# Patient Record
Sex: MALE | Race: WHITE
[De-identification: names, ages, dates, MRNs, and addresses within clinical notes are randomized per-mention and may not be internally consistent; named-entity substitution may affect disease eponyms.]

---

## 2019-01-08 NOTE — HP
*** AMENDED REPORT NOW INCLUDES DESIGNATED COSIGNER ***



CC:  Dr. Derek Rosario *

 

PREOPERATIVE HISTORY AND PHYSICAL:

 

DATE OF ADMISSION:  01/15/19

 

This patient is scheduled for same-day surgery admission by Dr. Joseph, on 
Tuesday, 01/15/19.

 

DATE OF EXAMINATION:  Tuesday, 01/08/19

 

ATTENDING SURGEON:  Dr. Lester Joseph * (dictated by Pema Burch NP).

 

CHIEF COMPLAINT:  Umbilical hernia, right inguinal hernia, possible left 
inguinal hernia.

 

HISTORY OF PRESENT ILLNESS:  The patient is a 68-year-old male with a history 
of hypertension, hypothyroidism, and benign prostatic hypertrophy.  He 
presented to the office for consultation with Dr. Joseph with a right inguinal 
hernia and an umbilical hernia which he has had for up to 1 year.  He notes 
that he was also told that he has a left inguinal hernia by a urologist about 7 
years ago.  Currently, he has discomfort in the right groin and this is most 
notable when he coughs or sneezes or if he has been standing for long periods.  
He denies any umbilical pain or left inguinal pain.  He started using a truss 
for the right inguinal hernia, but discontinued that because it was causing 
back spasms and he was prescribed a muscle relaxant.  He denies any signs or 
symptoms to suggest incarceration or strangulation; he has nocturia x1 typically
, a history of benign prostatic hypertrophy and bladder diverticula for which 
he sees a urologist in [place].  Dr. Joseph examined the patient and there is 
a reducible right inguinal hernia palpated and Dr. Joseph stated that he could 
not feel a left inguinal hernia.  He noted a reducible umbilical hernia with a 
1 to 2 cm sized defect.  Dr. Joseph reviewed the findings with the patient and 
discussed surgical options for repair. He explained the nature of hernias and 
their potential complications.  Dr. Joseph recommended surgical repair and the 
patient has opted for an open repair of the right inguinal hernia with mesh and 
open repair of umbilical hernia with mesh and possible open left inguinal 
hernia repair with mesh.  Dr. Joseph discussed the relevant risks and benefits 
and today I reviewed the typical postoperative care and recovery.  The patient 
has had a chance to ask questions and stated that he understands the 
information and is satisfied with the answers given to his questions.  He will 
sign surgical consent on the day of surgery.

 

PAST MEDICAL HISTORY:  Hypertension, hypothyroidism, and benign prostatic 
hypertrophy.

 

PAST SURGICAL HISTORY:  Vasectomy and excision of skin cancer on his scalp and 
tonsillectomy around age 11.

 

MEDICATIONS:

1.  Levothyroxine 100 mcg p.o. daily.

2.  Amlodipine 5 mg p.o. daily.

3.  Lisinopril 40 mg p.o. daily.

4.  Tamsulosin 0.4 mg p.o. b.i.d.

5.  Metaxalone 800 mg t.i.d. p.r.n. back spasms.

 

He states that he takes most of his medications in the evening.

 

ALLERGIES:  No known drug allergies.  He is allergic to BEE STINGS.

 

FAMILY HISTORY:  Father with a history of pancreatic cancer, sister had breast 
cancer.  No known anesthesia complications, bleeding disorders, or clotting 
disorders.

 

SOCIAL HISTORY:  He is  and retired.  He is a nonsmoker.  He denies use 
of alcohol or other substances.

 

REVIEW OF SYSTEMS:  Constitutional:  No fevers, chills, excessive fatigue, or 
weight loss.  Endocrine:  He is treated for hypothyroidism.  No diabetes. 
Hematologic:  No easy bruising or bleeding.  He has never required a blood 
transfusion.  Respiratory:  No dyspnea on exertion, no chronic cough. 
Cardiovascular:  No history of myocardial infarction, no anginal chest pain, no 
palpitations or chest pressure.  Gastrointestinal:  No nausea, vomiting, 
diarrhea, GI bleeding, or constipation.  No change in bowel habits.  
Genitourinary:  Nocturia x1 typically; benign prostatic hypertrophy.  No pain 
with urination. Musculoskeletal:  Intermittent back spasms, currently taking a 
muscle relaxant as needed, no other joint pain.  Integumentary:  No chronic 
rashes or skin changes. Neurologic:  No headache, no blurred vision, no areas 
of focal weakness or numbness.  General:  No previous anesthesia complications, 
no history of deep vein thrombosis or pulmonary embolism.

 

                               PHYSICAL EXAMINATION

 

GENERAL SURVEY:  The patient is a 68-year-old overweight male, well developed, 
in no acute distress.

 

VITAL SIGNS:  Height 68.5 inches, weight 225 pounds, body mass index 33.7.  
Blood pressure 130/84, pulse 72 and regular, respiratory rate 16, temperature 
97.3 tympanic.

 

SKIN:  Warm, dry, intact.

 

HEENT:  Benign.

 

NECK:  Supple.  No cervical lymphadenopathy, no thyromegaly.  Trachea midline.

 

BACK:  No CVA tenderness.

 

LUNGS:  Breath sounds bilaterally clear and equal.

 

HEART:  Regular rate and rhythm.  No murmurs or rubs appreciated.

 

ABDOMEN:  Obese, active bowel sounds, soft, nondistended, nontender throughout. 
Obvious umbilical hernia, reducible, with a 1 to 2 cm sized defect.  No other 
obvious abdominal masses, no scars, no organomegaly.  Inguinal exam done by Dr. Joseph reveals a reducible right inguinal hernia and no obvious left inguinal 
hernia was palpated.

 

EXTREMITIES:  Warm, without edema or skin ulceration.

 

GENITALIA AND RECTAL:  Exams deferred.

 

NEUROLOGIC:  Alert and oriented x3, steady gait.

 

 IMPRESSION:

1.  Right inguinal hernia.

2.  Possible left inguinal hernia.

3.  Umbilical hernia.

 

PLAN/RECOMMENDATIONS:  Same-day surgery admission to Dr. Joseph' service on 
Tuesday, 01/15/19, for open right inguinal hernia repair with mesh, possible 
open left inguinal hernia repair with mesh, open umbilical hernia repair with 
mesh.

 

 ____________________________________ ADELAIDA BURCH, NP

 

[id number]/CPS #: [id number]

MTDD

## 2019-01-15 ENCOUNTER — HOSPITAL ENCOUNTER (OUTPATIENT)
Dept: HOSPITAL 25 - OR | Age: 69
Discharge: HOME | End: 2019-01-15
Attending: SURGERY
Payer: COMMERCIAL

## 2019-01-15 VITALS — SYSTOLIC BLOOD PRESSURE: 125 MMHG | DIASTOLIC BLOOD PRESSURE: 87 MMHG

## 2019-01-15 DIAGNOSIS — K40.90: Primary | ICD-10-CM

## 2019-01-15 DIAGNOSIS — I10: ICD-10-CM

## 2019-01-15 DIAGNOSIS — N40.0: ICD-10-CM

## 2019-01-15 DIAGNOSIS — K42.9: ICD-10-CM

## 2019-01-15 DIAGNOSIS — E03.9: ICD-10-CM

## 2019-01-15 DIAGNOSIS — Z85.828: ICD-10-CM

## 2019-01-15 PROCEDURE — C1781 MESH (IMPLANTABLE): HCPCS

## 2019-01-16 NOTE — OP
CC:  Derek Rosario MD *

 

DATE OF OPERATION:  01/15/19 - [facility]

 

DATE OF BIRTH:  [birth year]

 

SURGEON:  Lester Joseph MD

 

ASSISTANT:  NATAN Bermeo

 

ANESTHESIOLOGIST:  Dr. George.

 

ANESTHESIA:  Local MAC.

 

PRE-OP DIAGNOSES:  Right inguinal hernia and umbilical hernia.

 

POST-OP DIAGNOSES:  Right inguinal hernia and umbilical hernia.

 

OPERATIVE PROCEDURE:  Open repair of right inguinal hernia with mesh and open 
primary repair of umbilical hernia.

 

ESTIMATED BLOOD LOSS:  Minimal.

 

FLUIDS:  Crystalloids.

 

SPECIMENS:  None.

 

DRAINS:  None.

 

COMPLICATIONS:  None.

 

COUNTS:  The instrument, needle, and sponge counts were correct.

 

OPERATIVE FINDINGS:  Direct and indirect right inguinal hernia.  An 1.5 cm 
umbilical hernia.

 

DESCRIPTION OF PROCEDURE:  The patient was brought to the operating room and 
placed on the table supine.  Sequential compression devices were placed on both 
lower extremities.  Intravenous sedation was administered as well as 
intravenous antibiotics.  The patient was positioned and padded appropriately 
and he was prepped and draped in the usual sterile fashion and a time-out was 
performed.

 

Local anesthetic was infiltrated as a field block in the right groin.  An 
oblique incision was created carrying this down into the subcutaneous tissue 
where crossing veins were divided between clamps and ligated with 4 absorbable 
ties.  Additional subcutaneous tissue was divided with cautery and external 
oblique aponeurosis was identified, then infiltrated with local anesthetic and 
opened along the line of its fibers through the superficial ring.  The patient 
had a large indirect inguinal hernia which contained small bowel and there was 
also a direct inguinal hernia which appeared to contain only fat.  The decision 
was to perform this repair with mesh.

 

First, the ilioinguinal nerve was identified, mobilized, and preserved in the 
lateral position.  The contents of the inguinal canal were then isolated with a 
1/4- inch Penrose drain and the indirect inguinal hernia sac was identified.  
The cord structures were dissected free from this and this was reduced beyond 
the deep ring. In order to keep this reduced, a sponge stick was used to hold 
this in while the dissection proceeded further identifying the direct inguinal 
hernia.  At this point, a Ethicon PHS extended size patch was selected and the 
preperitoneal space was opened along the lateral border of the rectus.  The 
preperitoneal space was dissected out using blunt dissection with a finger and 
sponge stick.  The inferior epigastric vessels were preserved.  The PHS patch 
was placed with a preperitoneal patch unfurled into the space and then the 
onlay portion was exteriorized and sutured to the pubic tubercle, the conjoint 
tendon and the shelving edge of the inguinal ligament with interrupted 2-0 
Vicryl suture.  An inferior slit was created in the mesh to allow the egress of 
the spermatic cord.  This slit was reconstituted with interrupted suture of 2-0 
Prolene.  Additional sutures of 2-0 Vicryl were placed along the inguinal 
ligament and at this point, because of the large size of the direct ring, it 
was decided to use a mesh plug in this position.  So, a plug was placed into 
the deep ring and sutured to the overlying musculature with 2-0 Vicryl. The 
lateral portion of the onlay part of the PHS was then tucked beneath the 
external oblique aponeurosis and sutured to the underlying muscle with 2-0 
Vicryl. Due to the patient's obesity and the size of the hernia and the coarse 
of the ilioinguinal nerve, it was decided that the nerve would have to be 
sacrificed; and therefore, this was clamped and divided, ligated with 4-0 
absorbable tie.  The external oblique aponeurosis was then run closed with 2-0 
Vicryl and the wound was then tacked with saline moistened gauze as the 
umbilical hernia was addressed.

 

Local anesthetic was again infiltrated as a field block.  An infraumbilical 
incision was created and the umbilicus was  from the anterior 
abdominal wall using sharp dissection. Fat contained within the hernia was 
reduced and the edges of the hernia defect were defined as 1.5 cm defect.  It 
was determined that this could be closed primarily and 0 Ethibond suture was 
used taking 2 U stitches to complete this closure.  The umbilical stalk was 
then reapproximated to the fascia with 3-0 Vicryl and the skin at this site was 
closed with 3-0 Vicryl for the deep dermis, 4-0 Monocryl for the skin in a 
running subcuticular fashion.  In the groin, the Saleem's fascia was closed 
with 3-0 Vicryl in interrupted fashion.  The skin was closed with 4-0 Monocryl 
in subcuticular fashion.  Steri-Strips and dressings were applied at both 
sites.  The patient tolerated the procedure well, was awakened and transferred 
to recovery room in stable condition.

 

 [id number]/CPS #: [id number]

MTDD

## 2019-10-29 ENCOUNTER — HOSPITAL ENCOUNTER (EMERGENCY)
Dept: HOSPITAL 25 - ED | Age: 69
Discharge: HOME | End: 2019-10-29
Payer: MEDICARE

## 2019-10-29 VITALS — SYSTOLIC BLOOD PRESSURE: 141 MMHG | DIASTOLIC BLOOD PRESSURE: 93 MMHG
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
## 2019-10-29 DIAGNOSIS — S73.102A: Primary | ICD-10-CM
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
## 2019-10-29 DIAGNOSIS — E07.9: ICD-10-CM

## 2019-10-29 DIAGNOSIS — Z98.52: ICD-10-CM

## 2019-10-29 DIAGNOSIS — Z85.828: ICD-10-CM

## 2019-10-29 DIAGNOSIS — I10: ICD-10-CM

## 2019-10-29 DIAGNOSIS — Z79.899: ICD-10-CM

## 2019-10-29 PROCEDURE — 99282 EMERGENCY DEPT VISIT SF MDM: CPT

## 2019-10-29 NOTE — XMS REPORT
Continuity of Care Document (CCD)

 Created on:2019



Patient:Rivas Maddox

Sex:Male

:1950

External Reference #:[id number]





Demographics







 Address  [address] [place] [address]

 

 Mobile Phone  [phone]

 

 Email Address  [email or web address]

 

 Preferred Language  en

 

 Marital Status  Not  or 

 

 Mu-ism Affiliation  Unknown

 

 Race  White

 

 Ethnic Group  Not  or 









Author







 Name  Lester Joseph MD, FACS (transmitted by agent of provider Adali Orozco)

 

 Address  [address]



   Unavailable



   [place] [address]









Care Team Providers







 Name  Role  Phone

 

 Derek Rosario MD - Family Medicine  Care Team Information   [phone]
[phone]









Problems







 Description

 

 No Information Available







Social History







 Type  Date  Description  Comments

 

 Birth Sex    Unknown  

 

 Smokeless Tobacco    Never Used Smokeless  



     Tobacco  

 

 ETOH Use    Occasionally consumes  



     alcohol  

 

 Tobacco Use  Start: Unknown  Patient has never  



     smoked  

 

 Recreational Drug Use    Current Drug User  occasional marijuana

 

 Smoking Status  Reviewed: 10/28/19  Patient has never  



     smoked  

 

 Exercise Type/Frequency    Exercises regularly  







Allergies, Adverse Reactions, Alerts







 Active Allergies  Reaction  Severity  Comments  Date

 

 Bee Sting        2018







Medications







 Active Medications  SIG  Qnty  Indications  Ordering Provider  Date

 

 Levothyroxine Sodium  1 by mouth      Unknown  



                 100mcg  every day        



 Tablets          

 

 Amlodipine Besylate  1 by mouth      Unknown  



                5mg  every day        



 Tablets          

 

 Lisinopril  1 by mouth      Unknown  



       40mg Tablets  every day        



           

 

 Tamsulosin HCL  1 by mouth      Unknown  



           0.4mg Capsules  twice a day        



           







Immunizations







 Description

 

 No Information Available







Vital Signs







 Date  Vital  Result  Comment

 

 10/28/2019  8:49am  Height  68.5 inches  5'8.50"









 Weight  220.00 lb  

 

 Heart Rate  72 /min  

 

 BP Systolic Sitting  124 mmHg  

 

 BP Diastolic Sitting  82 mmHg  

 

 Respiratory Rate  18 /min  

 

 Body Temperature  97.7 F  

 

 BMI (Body Mass Index)  33.0 kg/m2  









 2019  2:00pm  Heart Rate  58 /min  









 Respiratory Rate  18 /min  

 

 Body Temperature  97.2 F  







Results







 Description

 

 No Information Available







Procedures







 Description

 

 No Information Available







Medical Devices







 Description

 

 No Information Available







Encounters







 Description

 

 No Information Available







Assessments







 Date  Code  Description  Provider

 

 10/28/2019  R10.31  Right lower quadrant pain  Lester Joseph MD, FACS







Plan of Treatment

Future Appointment(s):2019  1:15 pm - Lester Joseph MD, FACS at [facility] - Lester Joseph MD, FACSR10.31 Right lower quadrant 
painFollow up:after testing is completed



Functional Status







 Description

 

 No Information Available







Mental Status







 Description

 

 No Information Available







Referrals







 Description

 

 No Information Available

## 2019-12-24 ENCOUNTER — HOSPITAL ENCOUNTER (EMERGENCY)
Dept: HOSPITAL 25 - ED | Age: 69
Discharge: HOME | End: 2019-12-24
Payer: MEDICARE

## 2019-12-24 VITALS — DIASTOLIC BLOOD PRESSURE: 92 MMHG | SYSTOLIC BLOOD PRESSURE: 116 MMHG
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
## 2019-12-24 DIAGNOSIS — I10: ICD-10-CM

## 2019-12-24 DIAGNOSIS — Z91.030: ICD-10-CM

## 2019-12-24 DIAGNOSIS — S49.92XA: Primary | ICD-10-CM

## 2019-12-24 PROCEDURE — 99282 EMERGENCY DEPT VISIT SF MDM: CPT

## 2019-12-24 NOTE — XMS REPORT
Continuity of Care Document (CCD)

 Created on:2019



Patient:Rivas Maddox

Sex:Male

:1950

External Reference #:[id number]





Demographics







 Address  [address] [place] [address]

 

 Mobile Phone  [phone]

 

 Email Address  [email or web address]

 

 Preferred Language  en

 

 Marital Status  Not  or 

 

 Adventism Affiliation  Unknown

 

 Race  White

 

 Ethnic Group  Not  or 









Author







 Name  Ashok Yan MD (transmitted by agent of provider Kilo Phillips)

 

 Address  [address] [place] [id number]









Care Team Providers







 Name  Role  Phone

 

 Derek Rosario MD - Family Medicine  Care Team Information   [phone]
[phone]









Problems







 Description

 

 No Information Available







Social History







 Type  Date  Description  Comments

 

 Birth Sex    Unknown  

 

 Smokeless Tobacco    Never Used Smokeless  



     Tobacco  

 

 ETOH Use    Occasionally consumes  



     alcohol  

 

 Tobacco Use  Start: Unknown  Patient has never  



     smoked  

 

 Recreational Drug Use    Current Drug User  occasional marijuana

 

 Smoking Status  Reviewed: 19  Patient has never  



     smoked  

 

 Exercise Type/Frequency    Exercises regularly  







Allergies, Adverse Reactions, Alerts







 Active Allergies  Reaction  Severity  Comments  Date

 

 Bee Sting        2018







Medications







 Active Medications  SIG  Qnty  Indications  Ordering Provider  Date

 

 Levothyroxine Sodium  1 by mouth      Unknown  



                 100mcg  every day        



 Tablets          

 

 Amlodipine Besylate  1 by mouth      Unknown  



                5mg  every day        



 Tablets          

 

 Lisinopril  1 by mouth      Unknown  



       40mg Tablets  every day        



           

 

 Tamsulosin HCL  2tabs by mouth      Unknown  



           0.4mg Capsules  once a day        



           









 History Medications









 Predisone  pt states he is taking a 9 day      Thien Casillas M.D.  2019 - 
2019



   supply then taper off to        



   discontinue        







Immunizations







 Description

 

 No Information Available







Vital Signs







 Date  Vital  Result  Comment

 

 2019  9:42am  Height  68.5 inches  5'8.50"









 Weight  225.00 lb  stated

 

 Heart Rate  74 /min  

 

 BP Systolic  128 mmHg  

 

 BP Diastolic  76 mmHg  

 

 Respiratory Rate  12 /min  

 

 Pain Level  4  

 

 BMI (Body Mass Index)  33.7 kg/m2  









 2019  1:45pm  Height  68.5 inches  5'8.50"









 Weight  225.25 lb  

 

 Heart Rate  86 /min  

 

 BP Systolic  140 mmHg  

 

 BP Diastolic  98 mmHg  

 

 Respiratory Rate  18 /min  

 

 Body Temperature  98.2 F  

 

 Pain Level  0  

 

 BMI (Body Mass Index)  33.7 kg/m2  







Results







 Description

 

 No Information Available







Procedures







 Date  Code  Description  Status

 

 2019  Inj/Aspir Major JT Or Bursa W/ US  Completed







Medical Devices







 Description

 

 No Information Available







Encounters







 Type  Date  Location  Provider  Dx  Diagnosis

 

 Office Visit  10/28/2019  [facility]  Lester Joseph,  R10.31  Right 
lower



   8:45a  Of Riz[place]ana HAIRSTON, FACS    quadrant pain







Assessments







 Date  Code  Description  Provider

 

 2019  M54.5  Low back pain  Ashok Yan MD

 

 2019  M16.12  Unilateral primary osteoarthritis, left  Ashok Yan MD



     hip  

 

 2019  M54.16  Radiculopathy, lumbar region  Ashok Yan MD

 

 2019  M54.5  Low back pain  Thien Casillas M.D.

 

 2019  S76.112D  Strain of left quadriceps muscle, fascia  Thien Casillas M.D.



     and tendon, subsequent encounter  

 

 10/28/2019  R10.31  Right lower quadrant pain  Lester Joseph MD, FACS







Plan of Treatment

Future Appointment(s):2019  1:15 pm - Ashok Yan MD at [facility] [id number] - Ashok Yan MDM54.5 Low back painNew 
Therapy:Physical TherapyFollow up:Follow up:   2 gykyyF28.12 Unilateral primary 
osteoarthritis, left hipNew Therapy:Physical EyrmgbwL21.16 Radiculopathy, 
lumbar regionNew Therapy:Physical Therapy



Functional Status







 Description

 

 No Information Available







Mental Status







 Description

 

 No Information Available







Referrals







 Description

 

 No Information Available

## 2019-12-24 NOTE — ED
Upper Extremity Pain





- HPI Summary


HPI Summary: 





This pt is a 70 y/o male presenting [facility] c/o worsening left shoulder pain s/

p recent fall today and injury 3 weeks ago. Pt reports 3 weeks ago he was 

walking to his car when he slipped and fell on his buttocks striking his left 

elbow. He notes he saw Dr. Yan, orthopedist, and was told he needed an MRI 

to rule our torn rotator cuff (which has already been scheduled). At that time 

pt had abduction of left arm up to 90 degrees and couldn't abduct arm any 

higher. Today he was in a rush when he slipped and fell landing on his left 

shoulder/left anterior chest. Today he reports he is unable to raise his left 

arm secondary to pain. He denies any other injuries from today's fall. Pt 

states he is able to ambulate without any difficulties.


No PMHx.


Denies tobacco, alcohol and drug use. 


NKDA. 








Medications reviewed. Allergies noted. 





- History of Current Complaint


Chief Complaint: RAYMUNDOhouBenito


Stated Complaint: FALL- SHOULDER AND BACK PAIN


Time Seen by Provider: 12/24/19 12:52


Hx Obtained From: Patient


Mechanism Of Injury: Fall From A Standing Position


Onset/Duration: Started Hours Ago, Still Present


Timing: Lasting Hours


Severity Currently: Mild


Pain Location: Shoulder - left


Aggravating Factor(s): Movement, Abduction


Alleviating Factor(s): Rest


Associated Signs & Symptoms: Positive: Negative





- Allergies/Home Medications


Allergies/Adverse Reactions: 


 Allergies











Allergy/AdvReac Type Severity Reaction Status Date / Time


 


bee venom protein (honey bee) Allergy  Swelling Verified 12/24/19 12:26














PMH/Surg Hx/FS Hx/Imm Hx


Endocrine/Hematology History: Reports: Hx Thyroid Disease


   Denies: Hx Diabetes


Cardiovascular History: Reports: Hx Hypertension - on meds


   Denies: Hx Pacemaker/ICD, Other Cardiovascular Problems/Disorders


Respiratory History: 


   Denies: Other Respiratory Problems/Disorders


GI History: 


   Denies: Other GI Disorders


 History: 


   Denies: Hx Renal Disease


Sensory History: 


   Denies: Hx Contacts or Glasses, Hx Hearing Aid


Opthamlomology History: 


   Denies: Hx Contacts or Glasses


Neurological History: Reports: Hx Migraine - past


   Denies: Other Neuro Impairments/Disorders


Psychiatric History: Reports: Hx Depression - hx of, no meds


   Denies: Hx Panic Disorder





- Cancer History


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





- Surgical History


Surgical History: Yes


Surgery Procedure, Year, and Place: squamous cell from head.  vasectomy 40 yrs 

ago.  HERNIA REPAIR 1/2019 [facility]


Hx Anesthesia Reactions: No


Infectious Disease History: No


Infectious Disease History: 


   Denies: Traveled Outside the [place] in Last 30 Days





- Family History


Known Family History: Positive: Non-Contributory





- Social History


Alcohol Use: Occasionally


Alcohol Amount: 1 per month


Substance Use Type: Reports: Marijuana


Substance Use Comment - Amount & Last Used: daily


Hx Tobacco Use: No


Smoking Status (MU): Never Smoked Tobacco





Review of Systems


Negative: Fever


Cardiovascular: Negative


Respiratory: Negative


Gastrointestinal: Negative


Musculoskeletal: Other - POSITIVE: left shoulder pain


Neurological: Negative


All Other Systems Reviewed And Are Negative: Yes





Physical Exam





- Summary


Physical Exam Summary: 





Constitutional: Well-developed, Well-nourished, Alert. (-) Distressed


Skin: Warm, Dry


HENT: Normocephalic; Atraumatic


Eyes: Conjunctiva normal


Neck: Musculoskeletal ROM normal neck. (-) JVD, (-) Stridor, (-) Tracheal 

deviation


Cardio: Rhythm regular, rate normal, Heart sounds normal; Intact distal pulses; 

The pedal pulses are 2+ and symmetric. Radial pulses are 2+ and symmetric. (-) 

Murmur


Pulmonary/Chest wall: Effort normal. (-) Respiratory distress, (-) Wheezes, (-) 

Rales


Abd: Soft, (-) tenderness, (-) Distension, (-) Guarding, (-) Rebound


Musculoskeletal: Left upper extremity: tenderness to the left anterior 

shoulder. Refusing to move at shoulder due to pain. Full  strength. No 

distal tenderness. Radial pulse 2+.


Lymph: (-) Cervical adenopathy


Neuro: Alert, Oriented x3


Psych: Mood and affect Normal


Triage Information Reviewed: Yes


Vital Signs On Initial Exam: 


 Initial Vitals











Temp Pulse Resp BP Pulse Ox


 


 99.3 F   93   16   144/106   98 


 


 12/24/19 12:22  12/24/19 12:22  12/24/19 12:22  12/24/19 12:22  12/24/19 12:22











Vital Signs Reviewed: Yes





Procedures





- Sedation


Patient Received Moderate/Deep Sedation with Procedure: No





Diagnostics





- Vital Signs


 Vital Signs











  Temp Pulse Resp BP Pulse Ox


 


 12/24/19 12:22  99.3 F  93  16  144/106  98














- Laboratory


Lab Statement: Any lab studies that have been ordered have been reviewed, and 

results considered in the medical decision making process.





- Radiology


  ** Left shoulder XR


Radiology Interpretation Completed By: Radiologist


Summary of Radiographic Findings: IMPRESSION: Again measured as a 7 mm joint 

space width at the left acromioclavicular joint. Please correlate to signs or 

symptoms of AC separation injury. If the patient's sympts persist, follow-up 

imaging is recommended. Dr. Fitch has reviewed this report.





Course/Dx





- Course


Course Of Treatment: Patient is here with worsening pain and immobility in his 

left shoulder following a second fall.  She had x-ray performed which showed no 

fracture.  Patient had no other injury.  Patient has follow-up already 

scheduled with orthopedic surgery for an MRI.  Patient is placed in a sling for 

comfort





- Diagnoses


Provider Diagnoses: 


 Injury of left shoulder








Discharge ED





- Sign-Out/Discharge


Documenting (check all that apply): Patient Departure - Discharge home





- Discharge Plan


Condition: Stable


Disposition: HOME


Patient Education Materials:  Shoulder Pain (ED)


Referrals: 


Derek Rosario MD [Primary Care Provider] - 


Ashok Yan MD [Medical Doctor] - 


Additional Instructions: 


Follow up with orthopedics. 


Take Ibuprofen and Tylenol for the pain.


Use the sling for comfort. 





PLEASE RETURN TO EMERGENCY DEPARTMENT FOR ANY CONCERNING SYMPTOMS.





- Billing Disposition and Condition


Condition: STABLE


Disposition: Home





- Attestation Statements


Document Initiated by Angy: Yes


Documenting Scribe: Trinity Price


Provider For Whom Angy is Documenting (Include Credential): Kaushal Fitch MD


Scribe Attestation: 


Trinity JOYCE, scribed for Kaushal Fitch MD on 12/24/19 at 1851. 


Scribe Documentation Reviewed: Yes


Provider Attestation: 


The documentation as recorded by the Trinity sweet accurately reflects 

the service I personally performed and the decisions made by me, Kaushal Fitch MD


Status of Scribe Document: Viewed

## 2019-12-24 NOTE — XMS REPORT
Continuity of Care Document (CCD)

 Created on:2019



Patient:Rivas Maddox

Sex:Male

:1950

External Reference #:[id number]





Demographics







 Address  [address] [place] [address]

 

 Mobile Phone  [phone]

 

 Email Address  [email or web address]

 

 Preferred Language  en

 

 Marital Status  Not  or 

 

 Jainism Affiliation  Unknown

 

 Race  White

 

 Ethnic Group  Not  or 









Author







 Name  Ashok Yan MD (transmitted by agent of provider Tata Treadwell)

 

 Address  [address] [place] [id number]









Care Team Providers







 Name  Role  Phone

 

 Derek Rosario MD - Family Medicine  Care Team Information   [phone]
[phone]









Problems







 Active Problems  Provider  Date

 

 Strain of rotator cuff capsule  Ashok Yan MD  Onset: 2019







Social History







 Type  Date  Description  Comments

 

 Birth Sex    Unknown  

 

 Smokeless Tobacco    Never Used Smokeless  



     Tobacco  

 

 ETOH Use    Occasionally consumes  



     alcohol  

 

 Tobacco Use  Start: Unknown  Patient has never  



     smoked  

 

 Recreational Drug Use    Current Drug User  occasional marijuana

 

 Smoking Status  Reviewed: 19  Patient has never  



     smoked  

 

 Exercise Type/Frequency    Exercises regularly  







Allergies, Adverse Reactions, Alerts







 Active Allergies  Reaction  Severity  Comments  Date

 

 Bee Sting        2018







Medications







 Active Medications  SIG  Qnty  Indications  Ordering Provider  Date

 

 Levothyroxine Sodium  1 by mouth      Unknown  



                 100mcg  every day        



 Tablets          

 

 Amlodipine Besylate  1 by mouth      Unknown  



                5mg  every day        



 Tablets          

 

 Lisinopril  1 by mouth      Unknown  



       40mg Tablets  every day        



           

 

 Tamsulosin HCL  2tabs by mouth      Unknown  



           0.4mg Capsules  once a day        



           









 History Medications









 Predisone  pt states he is taking a 9 day      Thien Casillas M.D.  2019 - 
2019



   supply then taper off to        



   discontinue        







Medications Administered in Office







 Medication  SIG  Qnty  Indications  Ordering Provider  Date

 

 No Injection        Ashok Yan MD  2019



    Injection          

 

 Depomedrol 40MG        Ashok Yan MD  2019



       Injection          



           







Immunizations







 Description

 

 No Information Available







Vital Signs







 Date  Vital  Result  Comment

 

 2019  3:20pm  Height  68.5 inches  5'8.50"









 Weight  225.00 lb  

 

 Heart Rate  88 /min  

 

 BP Systolic  130 mmHg  

 

 BP Diastolic  88 mmHg  

 

 Respiratory Rate  18 /min  

 

 Body Temperature  98.0 F  

 

 Pain Level  8  

 

 BMI (Body Mass Index)  33.7 kg/m2  









 2019  1:16pm  Height  68.5 inches  5'8.50"









 Weight  225.00 lb  

 

 Heart Rate  64 /min  

 

 BP Systolic Sitting  124 mmHg  

 

 BP Diastolic Sitting  80 mmHg  

 

 Respiratory Rate  16 /min  

 

 Pain Level  4  

 

 BMI (Body Mass Index)  33.7 kg/m2  







Results







 Description

 

 No Information Available







Procedures







 Date  Code  Description  Status

 

 2019  Inj/Aspir Major JT Or Bursa W/ US  Completed







Medical Devices







 Description

 

 No Information Available







Encounters







 Type  Date  Location  Provider  Dx  Diagnosis

 

 Office Visit  2019  [facility]  Ashok ROSADO  S46.012A  Strain of



   2:45p  at Kelsie Yan MD    musc/tend the



           rotator cuff of



           left shoulder,



           init

 

 [facility]  M54.5  Low back pain



   1:15p  at Kelsie Yan MD    









 M54.16  Radiculopathy, lumbar region

 

 M16.12  Unilateral primary osteoarthritis, left hip









 Office Visit  2019  9:30a  [facility]  Ashok ROSADO  M54.5  Low 
back



     at Kelsie Yan MD    pain









 M16.12  Unilateral primary osteoarthritis, left hip

 

 M54.16  Radiculopathy, lumbar region









 Office Visit  2019  1:30p  [facility]  Thien Casillas,  M54.5  Low 
back pain



     [facility]    









 S76.112A  Strain of left quadriceps muscle, fascia and tendon, init









 Office Visit  10/28/2019  8:45a  [facility]  Lester Joseph,  R10.31  Right lower



     [facility]    quadrant pain







Assessments







 Date  Code  Description  Provider

 

 2019  S46.012A  Strain of muscle(s) and tendon(s) of the  Ashok Yan MD



     rotator cuff of left shoulder, initial  



     encounter  

 

 2019  M54.5  Low back pain  Ashok Yan MD

 

 2019  M54.16  Radiculopathy, lumbar region  Ashok Yan MD

 

 2019  M16.12  Unilateral primary osteoarthritis, left  Ashok Yan MD



     hip  

 

 2019  M54.5  Low back pain  Ashok Yan MD

 

 2019  M16.12  Unilateral primary osteoarthritis, left  Ashok Yan MD



     hip  

 

 2019  M54.16  Radiculopathy, lumbar region  Ashok Yan MD

 

 2019  M54.5  Low back pain  Thien Casillas M.D.

 

 2019  S76.112A  Strain of left quadriceps muscle, fascia  Thien Casillas M.D.



     and tendon, initial encounter  

 

 10/28/2019  R10.31  Right lower quadrant pain  Lester Joseph MD, FACS







Plan of Treatment

2019 - Ashok Yan, MDS46.012A Strain of muscle(s) and tendon(s) 
of the rotator cuff of left shoulder, initial encounterNew Xrays:MRI Shoulder 
Left W/O, Ordered: 19Follow up:Follow up:   After MRI - MRI left shoulder
, likely RC tear



Functional Status







 Description

 

 No Information Available







Mental Status







 Description

 

 No Information Available







Referrals







 Refer to   Reason for Referral  Status  Appt Date

 

 Nomi Rush MD  Referral for left lower extremity pain,  Created  



   radiculopathy, MRI pending    









 [address] [place] [id number] [phone]

## 2019-12-24 NOTE — XMS REPORT
Continuity of Care Document (CCD)

 Created on:2019



Patient:Rivas Maddox

Sex:Male

:1950

External Reference #:[id number]





Demographics







 Address  [address] [place] [address]

 

 Mobile Phone  [phone]

 

 Email Address  [email or web address]

 

 Preferred Language  en

 

 Marital Status  Not  or 

 

 Protestant Affiliation  Unknown

 

 Race  White

 

 Ethnic Group  Not  or 









Author







 Name  Thien Casillas M.D. (transmitted by agent of provider Nhung Thorne)

 

 Address  [address] [place] [id number]









Care Team Providers







 Name  Role  Phone

 

 Derek Rosario MD - Family Medicine  Care Team Information   [phone]
[phone]









Problems







 Description

 

 No Information Available







Social History







 Type  Date  Description  Comments

 

 Birth Sex    Unknown  

 

 Smokeless Tobacco    Never Used Smokeless  



     Tobacco  

 

 ETOH Use    Occasionally consumes  



     alcohol  

 

 Tobacco Use  Start: Unknown  Patient has never  



     smoked  

 

 Recreational Drug Use    Current Drug User  occasional marijuana

 

 Smoking Status  Reviewed: 19  Patient has never  



     smoked  

 

 Exercise Type/Frequency    Exercises regularly  







Allergies, Adverse Reactions, Alerts







 Active Allergies  Reaction  Severity  Comments  Date

 

 Bee Sting        2018







Medications







 Active Medications  SIG  Qnty  Indications  Ordering  Date



         Provider  

 

 Predisone  pt states he is      Thien Casillas M.D.  2019



   taking a 9 day        



   supply then taper        



   off to discontinue        

 

 Levothyroxine Sodium  1 by mouth every      Unknown  



   day        



 100mcg Tablets          



           

 

 Amlodipine Besylate  1 by mouth every      Unknown  



                  5mg  day        



 Tablets          



           

 

 Lisinopril  1 by mouth every      Unknown  



         40mg Tablets  day        



           

 

 Tamsulosin HCL  2tabs by mouth once      Unknown  



             0.4mg  a day        



 Capsules          



           







Immunizations







 Description

 

 No Information Available







Vital Signs







 Date  Vital  Result  Comment

 

 2019  1:45pm  Height  68.5 inches  5'8.50"









 Weight  225.25 lb  

 

 Heart Rate  86 /min  

 

 BP Systolic  140 mmHg  

 

 BP Diastolic  98 mmHg  

 

 Respiratory Rate  18 /min  

 

 Body Temperature  98.2 F  

 

 Pain Level  0  

 

 BMI (Body Mass Index)  33.7 kg/m2  









 10/28/2019  8:49am  Height  68.5 inches  5'8.50"









 Weight  220.00 lb  

 

 Heart Rate  72 /min  

 

 BP Systolic Sitting  124 mmHg  

 

 BP Diastolic Sitting  82 mmHg  

 

 Respiratory Rate  18 /min  

 

 Body Temperature  97.7 F  

 

 BMI (Body Mass Index)  33.0 kg/m2  







Results







 Description

 

 No Information Available







Procedures







 Description

 

 No Information Available







Medical Devices







 Description

 

 No Information Available







Encounters







 Type  Date  Location  Provider  Dx  Diagnosis

 

 Office Visit  10/28/2019  [facility]  Lester Joseph,  R10.31  Right 
lower



   8:45a  [facility]    quadrant pain







Assessments







 Date  Code  Description  Provider

 

 2019  M54.5  Low back pain  Thien Casillas M.D.

 

 2019  S76.112D  Strain of left quadriceps muscle, fascia  Thien Casillas M.D.



     and tendon, subsequent encounter  

 

 10/28/2019  R10.31  Right lower quadrant pain  Lester Joseph MD, FACS







Plan of Treatment

Future Appointment(s):2019  9:30 am - Thien Casillas M.D. at [facility] at [id number]  1:15 pm - Lester Joseph MD, FACS at [facility]2019 - Thien Casillas M.D.M54.5 Low back painNew Therapy:
Physical TherapyFollow up:Follow up:   3 - 5 xklnaC65.112D Strain of left 
quadriceps muscle, fascia and tendon, subsequent encounter



Functional Status







 Description

 

 No Information Available







Mental Status







 Description

 

 No Information Available







Referrals







 Description

 

 No Information Available

## 2019-12-24 NOTE — XMS REPORT
Continuity of Care Document (CCD)

 Created on:2019



Patient:Rivas Maddox

Sex:Male

:1950

External Reference #:[id number]





Demographics







 Address  [address] [place] [address]

 

 Home Phone  [phone]

 

 Mobile Phone  [phone]

 

 Email Address  [email or web address]

 

 Preferred Language  en

 

 Marital Status  Not  or 

 

 Pentecostal Affiliation  Unknown

 

 Race  White

 

 Ethnic Group  Not  or 









Author







 Name  Serene Juve, ZHANNA

 

 Address  [address] [place] [address]









Care Team Providers







 Name  Role  Phone

 

 Rj Sage MD - Cardiovascular  Care Team Information   [phone]
[phone]



 Disease    

 

 Radha Torres MD - Dermatology  Care Team Information   [phone]
[phone]

 

 Gastroenterology [facility] -  Care Team Information   [phone]



 Gastroenterology    

 

 Brigette Bro - Gastroenterology  Care Team Information   [phone]









Problems







 Active Problems  Provider  Date

 

 Digestive symptom  Derek Rosario M.D.  Onset: 2016

 

 Mixed hyperlipidemia  Derek Rosario M.D.  Onset: 2016

 

 Disorder of thyroid gland  Derek Rosario M.D.  Onset: 2016

 

 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

 

 Essential hypertension  Derek Rosario M.D.  Onset: 2016

 

 Hypothyroidism  Derek Rosario M.D.  Onset: 2013

 

 Diarrhea  Derek Rosario M.D.  Onset: 10/12/2012

 

 Benign prostatic hypertrophy without outflow  Derek Rosario M.D.  Onset: 10
/2012



 obstruction    

 

 Congenital anomaly of aorta  Derek Rosario M.D.  Onset: 10/12/2012

 

 Depressive disorder  Derek Rosario M.D.  Onset: 10/12/2012

 

 Benign essential hypertension  Derek Rosario M.D.  Onset: 10/12/2012







Social History







 Type  Date  Description  Comments

 

 Birth Sex    Unknown  

 

 Tobacco Use  Start: Unknown  Never Smoked Cigarettes  

 

 Smoking Status  Reviewed: 19  Never Smoked Cigarettes  

 

 Tobacco Use  Start: Unknown  nonsmoker  

 

 Recreational Drug Use    Marijuana  







Allergies, Adverse Reactions, Alerts







 Active Allergies  Reaction  Severity  Comments  Date

 

 NKDA        2019

 

 Bee Stings        2009







Medications







 Active Medications  SIG  Qnty  Indications  Ordering Provider  Date

 

 Prednisone  3 tabs x 4  21tabs  M25.552  Serene An,  10/30/2019



         20mg Tablets  days, 2 tab x 3      FNP  



   days, 1 tab x 3        



   days then stop        

 

 Levothyroxine Sodium  Take 1 Tablet  90tabs  E03.9  Derek FKenan Rosario,  2014



   By Mouth Every      M.D.  



 100mcg Tablets  Day        



           

 

 Amlodipine Besylate  Take 1 Tablet  90tabs  I10  Derek FKenan Rosario,  2013



                  5mg  By Mouth Every      M.D.  



 Tablets  Day        



           

 

 Lisinopril  Take 1 Tablet  90tabs  I10  Derek F. Marlene,  2011



         40mg Tablets  By Mouth Every      M.D.  



   Day        









 I10









 Tamsulosin HCL  1 by mouth every day      Unknown  



 0.4mg Capsules          

 

 Cyclobenzaprine HCL  take 1-2 tablets by mouth      Unknown  



     5mg Tablets  at night as needed for back        



   spasm        









 History Medications









 Naproxen  take one tablet  45tabs  M25.552  Pauline Lux, [facility]  10/24/2019 -



           500mg  by mouth every        10/30/2019



 Tablets  12 hours with        



   food        









 M54.5

 

 M25.551









 Unithroid  Take 1 Tablet By  90Tablet    Derek ASHLEEKenan Rosario,  10/05/2019 -



           100mcg  Mouth Every Day      M.D.  10/24/2019



 Tablets          



           







Immunizations







 CPT Code  Status  Date  Vaccine  Lot #

 

 [id number]  Given  10/25/2018  High-Dose, Influenza Virus Vacccine-fluzone 65 and  



       older  

 

 [id number]  Given  2018  High-Dose, Influenza Virus Vacccine-fluzone 65 and  



       older  

 

 [id number]  Given  2016  Zostivax  [id number]

 

 17027  Given  10/18/2011  DO Not Use Split Influenza Virus Vaccine  [id number]

 

 [id number]  Given  2005  Td Immunization, For Use In Individuals 7 Years Or  



       Older  







Vital Signs







 Date  Vital  Result  Comment

 

 10/30/2019  2:03pm  BP Systolic  160 mmHg  









 BP Diastolic  100 mmHg  

 

 Heart Rate  72 /min  

 

 Body Temperature  97.9 F  

 

 Respiratory Rate  18 /min  

 

 Weight  224.00 lb  









 10/24/2019 10:32am  BP Systolic  122 mmHg  









 BP Diastolic  70 mmHg  

 

 Heart Rate  66 /min  

 

 Body Temperature  97.5 F  

 

 Height  67.5 inches  5'7.50"

 

 Weight  224.00 lb  

 

 BMI (Body Mass Index)  34.6 kg/m2  







Results







 Description

 

 No Information Available







Procedures







 Date  Code  Description  Status

 

 10/21/2016  09929026  Colonoscopy  Completed

 

 10/12/2016  78297525  Colonoscopy  Completed







Medical Devices







 Description

 

 No Information Available







Encounters







 Type  Date  Location  Provider  Dx  Diagnosis

 

 Office Visit  10/24/2019  [facility]  ZHANNA Pedroza  M25.552  Pain 
in left hip



   10:30a        









 M54.5  Low back pain

 

 M25.551  Pain in right hip







Assessments







 Date  Code  Description  Provider

 

 10/30/2019  M25.552  Pain in left hip  Serene An, ZHANNA

 

 10/30/2019  M79.605  Pain in left leg  ZHANNA Ahn

 

 10/24/2019  M25.552  Pain in left hip  Pauline Lux, ZHANNA

 

 10/24/2019  M54.5  Low back pain  Pauline Lux, ZHANNA

 

 10/24/2019  M25.551  Pain in right hip  ZHANNA Pedroza







Plan of Treatment

10/30/2019 - Serene Juve, MARIANOPM25.552 Pain in left hipNew Medication:
Prednisone 20 mg - 3 tabs x 4 days, 2 tab x 3 days, 1 tab x 3 days then 
stopComments:SYMPTOMS are MOST consistent with osteoarthritis of the hip, in a 
flareWhy flare? Who knows, maybe Dr Ramirez does!NO NAPROXEN while using my 
PREDNISONE Ok to continue cyclobenzeprine as previously prescribed if it's 
helpfulHeat/cold as helpful, tolerated; if you can get in a hot tub that might 
really workAlso remember epsom soak for sore muscles-- if getting in/out of tub 
is possible If Prednisone makes it worse, or you develop any new/unusual 
symptoms, you need to be seen again promptlyI won't haveyou book follow-up here 
since you're seeing orthopod on Monday, but do come back if you don't make 
xezothpcY51.605 Pain in left legAllComments:Medication Management Patient 
Understands medications  he 's taking?     Yes    No  Are there Barriers to 
Adherence?    Yes    No  Has the patient been asked about herbal supplements 
and therapies, andOTC  meds?      Yes    No      As always, we strongly 
encourage a healthy diet and making physical activity a part of your every day 
life. If you have questions about how or where to start, please contact the 
office.



Functional Status







 Description

 

 No Information Available







Mental Status







 Description

 

 No Information Available







Referrals







 Description

 

 No Information Available